# Patient Record
Sex: FEMALE | Race: AMERICAN INDIAN OR ALASKA NATIVE
[De-identification: names, ages, dates, MRNs, and addresses within clinical notes are randomized per-mention and may not be internally consistent; named-entity substitution may affect disease eponyms.]

---

## 2018-04-26 ENCOUNTER — HOSPITAL ENCOUNTER (EMERGENCY)
Dept: HOSPITAL 5 - ED | Age: 53
LOS: 1 days | Discharge: HOME | End: 2018-04-27
Payer: SELF-PAY

## 2018-04-26 VITALS — SYSTOLIC BLOOD PRESSURE: 171 MMHG | DIASTOLIC BLOOD PRESSURE: 80 MMHG

## 2018-04-26 DIAGNOSIS — Z88.0: ICD-10-CM

## 2018-04-26 DIAGNOSIS — N20.2: Primary | ICD-10-CM

## 2018-04-26 LAB
ALBUMIN SERPL-MCNC: 4.1 G/DL (ref 3.9–5)
ALT SERPL-CCNC: 12 UNITS/L (ref 7–56)
BACTERIA #/AREA URNS HPF: (no result) /HPF
BASOPHILS # (AUTO): 0.1 K/MM3 (ref 0–0.1)
BASOPHILS NFR BLD AUTO: 0.6 % (ref 0–1.8)
BILIRUB UR QL STRIP: (no result)
BLOOD UR QL VISUAL: (no result)
BUN SERPL-MCNC: 14 MG/DL (ref 7–17)
BUN/CREAT SERPL: 18 %
CALCIUM SERPL-MCNC: 9.6 MG/DL (ref 8.4–10.2)
EOSINOPHIL # BLD AUTO: 0.3 K/MM3 (ref 0–0.4)
EOSINOPHIL NFR BLD AUTO: 2.3 % (ref 0–4.3)
HCT VFR BLD CALC: 42.9 % (ref 30.3–42.9)
HEMOLYSIS INDEX: 8
HGB BLD-MCNC: 14.4 GM/DL (ref 10.1–14.3)
LIPASE SERPL-CCNC: 31 UNITS/L (ref 13–60)
LYMPHOCYTES # BLD AUTO: 2.6 K/MM3 (ref 1.2–5.4)
LYMPHOCYTES NFR BLD AUTO: 23 % (ref 13.4–35)
MCH RBC QN AUTO: 30 PG (ref 28–32)
MCHC RBC AUTO-ENTMCNC: 33 % (ref 30–34)
MCV RBC AUTO: 89 FL (ref 79–97)
MONOCYTES # (AUTO): 0.9 K/MM3 (ref 0–0.8)
MONOCYTES % (AUTO): 7.8 % (ref 0–7.3)
PH UR STRIP: 6 [PH] (ref 5–7)
PLATELET # BLD: 312 K/MM3 (ref 140–440)
PROT UR STRIP-MCNC: (no result) MG/DL
RBC # BLD AUTO: 4.8 M/MM3 (ref 3.65–5.03)
RBC #/AREA URNS HPF: 43 /HPF (ref 0–6)
UROBILINOGEN UR-MCNC: < 2 MG/DL (ref ?–2)
WBC #/AREA URNS HPF: 16 /HPF (ref 0–6)

## 2018-04-26 PROCEDURE — 96375 TX/PRO/DX INJ NEW DRUG ADDON: CPT

## 2018-04-26 PROCEDURE — 80053 COMPREHEN METABOLIC PANEL: CPT

## 2018-04-26 PROCEDURE — 99284 EMERGENCY DEPT VISIT MOD MDM: CPT

## 2018-04-26 PROCEDURE — 83690 ASSAY OF LIPASE: CPT

## 2018-04-26 PROCEDURE — 96376 TX/PRO/DX INJ SAME DRUG ADON: CPT

## 2018-04-26 PROCEDURE — 85025 COMPLETE CBC W/AUTO DIFF WBC: CPT

## 2018-04-26 PROCEDURE — 74177 CT ABD & PELVIS W/CONTRAST: CPT

## 2018-04-26 PROCEDURE — 96365 THER/PROPH/DIAG IV INF INIT: CPT

## 2018-04-26 PROCEDURE — 36415 COLL VENOUS BLD VENIPUNCTURE: CPT

## 2018-04-26 PROCEDURE — 81001 URINALYSIS AUTO W/SCOPE: CPT

## 2018-04-26 RX ADMIN — MORPHINE SULFATE ONE MG: 2 INJECTION, SOLUTION INTRAMUSCULAR; INTRAVENOUS at 23:15

## 2018-04-26 NOTE — EMERGENCY DEPARTMENT REPORT
ED Abdominal Pain HPI





- General


Chief Complaint: Abdominal Pain


Stated Complaint: ABDOMINAL PAIN


Time Seen by Provider: 18 22:45


Source: patient, family


Mode of arrival: Ambulatory


Limitations: Language Barrier





- History of Present Illness


Initial Comments: 





Patient is 52 years old female with no significant past medical history.  

Patient presented to the ER complaining of right flank pain, started 5 days 

ago.  Patient stated that she went to see her primary care physician and she 

was prescribed ciprofloxacin.  Patient stated that she is unable to keep 

anything down.  She denied any fever or diarrhea.


MD Complaint: abdominal pain, flank pain


-: days(s)


Location: R flank


Radiation: none


Migration to: no migration


Severity scale (0 -10): 7


Quality: sharp


Improves With: vomiting


Associated Symptoms: nausea.  denies: chills, constipation, dysuria





- Related Data


 Allergies











Allergy/AdvReac Type Severity Reaction Status Date / Time


 


Penicillins Allergy  Rash Verified 18 19:55














ED Review of Systems


ROS: 


Stated complaint: ABDOMINAL PAIN


Other details as noted in HPI





Comment: All other systems reviewed and negative


ENT: denies: throat pain


Respiratory: denies: cough, orthopnea, shortness of breath, SOB with exertion, 

SOB at rest, wheezing


Cardiovascular: denies: chest pain, palpitations, dyspnea on exertion, orthopnea

, paroxysmal nocturnal dyspnea


Gastrointestinal: abdominal pain, nausea, vomiting.  denies: diarrhea, 

constipation, hematemesis, melena, hematochezia


Genitourinary: denies: urgency, dysuria


Musculoskeletal: back pain


Neurological: denies: headache, weakness, numbness, paresthesias, confusion, 

abnormal gait





ED Past Medical Hx





- Past Medical History


Previous Medical History?: No





- Surgical History


Past Surgical History?: Yes


Hx Cholecystectomy: Yes





- Social History


Smoking Status: Never Smoker


Substance Use Type: None





ED Physical Exam





- General


Limitations: Language Barrier


General appearance: alert, in no apparent distress





- Head


Head exam: Present: atraumatic, normocephalic





- Eye


Eye exam: Present: normal appearance





- ENT


ENT exam: Present: normal exam, normal orophraynx, mucous membranes dry





- Neck


Neck exam: Present: normal inspection, full ROM.  Absent: tenderness, 

meningismus, lymphadenopathy, thyromegaly





- Respiratory


Respiratory exam: Present: normal lung sounds bilaterally.  Absent: respiratory 

distress, wheezes, rales, rhonchi, stridor, chest wall tenderness, accessory 

muscle use, decreased breath sounds, prolonged expiratory





- Cardiovascular


Cardiovascular Exam: Present: regular rate, normal rhythm, normal heart sounds





- GI/Abdominal


GI/Abdominal exam: Present: soft, normal bowel sounds.  Absent: distended, 

tenderness, guarding, rebound, rigid, organomegaly, mass, bruit, pulsatile mass

, hernia





- Extremities Exam


Extremities exam: Present: normal inspection, full ROM, normal capillary refill





- Back Exam


Back exam: Present: normal inspection, full ROM, CVA tenderness (R).  Absent: 

tenderness, CVA tenderness (L), muscle spasm, paraspinal tenderness, vertebral 

tenderness





- Neurological Exam


Neurological exam: Present: alert, oriented X3, CN II-XII intact, normal gait





- Skin


Skin exam: Present: warm, intact, normal color.  Absent: cyanosis, diaphoretic, 

erythema, urticaria





ED Course


 Vital Signs











  18





  19:47 22:58 23:15


 


Temperature 98.2 F  


 


Pulse Rate 65 63 


 


Respiratory 19 16 16





Rate   


 


Blood Pressure 194/115  


 


Blood Pressure  171/80 





[Left]   


 


O2 Sat by Pulse 99 98 99





Oximetry   














- Reevaluation(s)


Reevaluation #1: 





18 01:49


Patient stated that she is feeling much better.  I informed the patient and her 

daughter about the CT abdomen and pelvis results which showed a right ureteric 

stone partially obstructing.  I gave them Dr. Reid TO follow-up in the next 

2-3 days.





ED Medical Decision Making





- Lab Data


Result diagrams: 


 18 20:12





 18 20:12





- Radiology Data


Radiology results: report reviewed





Referring Physician:   NASIR GARCIA


Patient Name:   AMELIE ALVARADO


Patient ID:   [id number]


YOB: 1965


Sex:   Female


Accession:   [id number]


Report Date:   2018


Report Status:   Finalized


Findings


[address] [address] 





Cat Scan Report 


Signed 





Patient: AMELIE JIMENEZ MA MR#: [id number] 


: 1965 Acct:[id number] 


Age/Sex: 52 / F ADM Date: 18 


Loc: ED 


Attending Dr: 








Ordering Physician: NASIR GARCIA 


Date of Service: 18 


Procedure(s): CT abdomen pelvis w con 


Accession Number(s): [id number] 





cc: NASIR GARCIA 








FINAL REPORT 





EXAM: CT ABDOMEN PELVIS W CON 





HISTORY: abdominal pain/RT Flank pain 





TECHNIQUE: Routine axial imaging was obtained of the abdomen and 


pelvis following the intravenous injection of 100 cc of Omnipaque 


300. Delayed imaging was obtained through the kidneys ureters and 


bladder. Sagittal and coronal reconstructions were reviewed. 





FINDINGS: 


The lung bases reveal interstitial prominence in both lower lobes 


with small effusions. Interstitial edema cannot be excluded. The 


gallbladder has been removed. The biliary tree is not dilated. 


The liver, pancreas, spleen, and adrenal glands appear normal. 





There is moderate right-sided hydronephrosis secondary to a 


partially obstructing 6.8 mm stone in the proximal right ureter. 


There are no additional renal stones. The left kidney is 


unremarkable. The vascular structures enhance normally. The bowel 


loops are normal in caliber and course. There are multiple 


uncomplicated diverticula in the descending colon. The appendix 


is not seen. In the pelvis the uterus and bladder appear normal. 


Adenopathy is not identified. The skeletal structures do not show 


any acute changes. 





IMPRESSION: 


Moderate right-sided hydronephrosis secondary to a partially 


obstructing 6.8 mm stone in the proximal right ureter. 





Interstitial prominence in both lung bases with small effusions. 


Interstitial edema cannot be excluded. 





Uncomplicated colonic diverticulosis in the descending colon. 





Cholecystectomy. 











Transcribed By: RB 


Dictated By: AMANUEL ALY MD 


Electronically Authenticated By: AMANUEL ALY MD 


Signed Date/Time: 18 











DD/DT: 18 


TD/TT: 18


Critical care attestation.: 


If time is entered above; I have spent that time in minutes in the direct care 

of this critically ill patient, excluding procedure time.








ED Disposition


Clinical Impression: 


 Abdominal pain, Kidney stone, Ureterolithiasis, Renal colic on right side





Disposition: -01 TO HOME OR SELFCARE


Is pt being admited?: No


Condition: Stable


Instructions:  Kidney Stones (ED), Renal Colic (ED), Abdominal Pain (ED)


Referrals: 


PEPE REID MD [Staff Physician] - 3-5 Days

## 2018-04-27 RX ADMIN — MORPHINE SULFATE ONE MG: 2 INJECTION, SOLUTION INTRAMUSCULAR; INTRAVENOUS at 00:14

## 2018-04-27 NOTE — CAT SCAN REPORT
FINAL REPORT



EXAM:  CT ABDOMEN PELVIS W CON



HISTORY:  abdominal pain/RT Flank pain 



TECHNIQUE:  Routine axial imaging was obtained of the abdomen and

pelvis following the intravenous injection of 100 cc of Omnipaque

300. Delayed imaging was obtained through the kidneys ureters and

bladder. Sagittal and coronal reconstructions were reviewed.



FINDINGS:  

The lung bases reveal interstitial prominence in both lower lobes

with small effusions. Interstitial edema cannot be excluded. The

gallbladder has been removed. The biliary tree is not dilated.

The liver, pancreas, spleen, and adrenal glands appear normal.



There is moderate right-sided hydronephrosis secondary to a

partially obstructing 6.8 mm stone in the proximal right ureter.

There are no additional renal stones. The left kidney is

unremarkable. The vascular structures enhance normally. The bowel

loops are normal in caliber and course. There are multiple

uncomplicated diverticula in the descending colon. The appendix

is not seen. In the pelvis the uterus and bladder appear normal.

Adenopathy is not identified. The skeletal structures do not show

any acute changes.



IMPRESSION:  

Moderate right-sided hydronephrosis secondary to a partially

obstructing 6.8 mm stone in the proximal right ureter.



Interstitial prominence in both lung bases with small effusions.

Interstitial edema cannot be excluded.



Uncomplicated colonic diverticulosis in the descending colon.



Cholecystectomy.